# Patient Record
Sex: FEMALE | Race: BLACK OR AFRICAN AMERICAN | NOT HISPANIC OR LATINO | Employment: FULL TIME | RURAL
[De-identification: names, ages, dates, MRNs, and addresses within clinical notes are randomized per-mention and may not be internally consistent; named-entity substitution may affect disease eponyms.]

---

## 2018-05-09 ENCOUNTER — OFFICE VISIT (OUTPATIENT)
Dept: FAMILY MEDICINE CLINIC | Facility: CLINIC | Age: 33
End: 2018-05-09

## 2018-05-09 VITALS
TEMPERATURE: 99.7 F | HEART RATE: 76 BPM | HEIGHT: 70 IN | DIASTOLIC BLOOD PRESSURE: 66 MMHG | OXYGEN SATURATION: 97 % | SYSTOLIC BLOOD PRESSURE: 107 MMHG | RESPIRATION RATE: 20 BRPM | BODY MASS INDEX: 22.48 KG/M2 | WEIGHT: 157 LBS

## 2018-05-09 DIAGNOSIS — M54.41 ACUTE BILATERAL LOW BACK PAIN WITH BILATERAL SCIATICA: Primary | ICD-10-CM

## 2018-05-09 DIAGNOSIS — M54.42 ACUTE BILATERAL LOW BACK PAIN WITH BILATERAL SCIATICA: Primary | ICD-10-CM

## 2018-05-09 LAB
BILIRUB BLD-MCNC: NEGATIVE MG/DL
CLARITY, POC: CLEAR
COLOR UR: YELLOW
GLUCOSE UR STRIP-MCNC: NEGATIVE MG/DL
KETONES UR QL: NEGATIVE
LEUKOCYTE EST, POC: NEGATIVE
NITRITE UR-MCNC: NEGATIVE MG/ML
PH UR: 6 [PH] (ref 5–8)
PROT UR STRIP-MCNC: NEGATIVE MG/DL
RBC # UR STRIP: ABNORMAL /UL
SP GR UR: 1 (ref 1–1.03)
UROBILINOGEN UR QL: NORMAL

## 2018-05-09 PROCEDURE — 99213 OFFICE O/P EST LOW 20 MIN: CPT | Performed by: NURSE PRACTITIONER

## 2018-05-09 PROCEDURE — 96372 THER/PROPH/DIAG INJ SC/IM: CPT | Performed by: NURSE PRACTITIONER

## 2018-05-09 PROCEDURE — 81002 URINALYSIS NONAUTO W/O SCOPE: CPT | Performed by: NURSE PRACTITIONER

## 2018-05-09 RX ORDER — TRIAMCINOLONE ACETONIDE 40 MG/ML
40 INJECTION, SUSPENSION INTRA-ARTICULAR; INTRAMUSCULAR ONCE
Status: COMPLETED | OUTPATIENT
Start: 2018-05-09 | End: 2018-05-09

## 2018-05-09 RX ORDER — METHOCARBAMOL 500 MG/1
500 TABLET, FILM COATED ORAL NIGHTLY PRN
Qty: 15 TABLET | Refills: 0 | Status: SHIPPED | OUTPATIENT
Start: 2018-05-09

## 2018-05-09 RX ADMIN — TRIAMCINOLONE ACETONIDE 40 MG: 40 INJECTION, SUSPENSION INTRA-ARTICULAR; INTRAMUSCULAR at 12:45

## 2018-05-09 NOTE — PROGRESS NOTES
Subjective   Victoria Rogel is a 33 y.o. female.     Back Pain   This is a new problem. Episode onset: x 2-3 days. The problem occurs constantly. The problem is unchanged. The pain is present in the lumbar spine. The quality of the pain is described as aching and shooting. Radiates to: bilateral thighs. The pain is at a severity of 9/10. The symptoms are aggravated by twisting, standing and position. Associated symptoms include leg pain. Pertinent negatives include no abdominal pain, bladder incontinence, bowel incontinence, chest pain, dysuria, fever, headaches, numbness, paresis, paresthesias, pelvic pain, perianal numbness, tingling, weakness or weight loss. 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 She has tried nothing (afraid to take OTC meds due to history of stomach problems) for the symptoms.        The following portions of the patient's history were reviewed and updated as appropriate: allergies, current medications, past medical history, past social history, past surgical history and problem list.    Review of Systems   Constitutional: Negative for appetite change, chills, fatigue, fever and unexpected weight loss.   HENT: Negative for sore throat.    Eyes: Negative for discharge and itching.   Respiratory: Negative for cough, chest tightness, shortness of breath and wheezing.    Cardiovascular: Negative for chest pain.   Gastrointestinal: Negative for abdominal pain, blood in stool, bowel incontinence, diarrhea, nausea and vomiting.   Genitourinary: Negative for urinary incontinence, difficulty urinating, dysuria, flank pain, frequency, pelvic pain and vaginal discharge.        Due to start cycle today     Musculoskeletal: Positive for back pain. Negative for neck pain and neck stiffness.   Skin: Negative for rash.   Neurological: Negative for dizziness, tingling, weakness, numbness, headache and paresthesias.   Hematological: Negative for adenopathy.       Objective    BP  "107/66 (BP Location: Left arm, Patient Position: Sitting, Cuff Size: Adult)   Pulse 76   Temp 99.7 °F (37.6 °C) (Tympanic)   Resp 20   Ht 177.8 cm (70\")   Wt 71.2 kg (157 lb)   LMP 04/12/2018 (Approximate)   SpO2 97%   BMI 22.53 kg/m²     Physical Exam   Constitutional: She is oriented to person, place, and time. She appears well-developed and well-nourished. No distress.   Cardiovascular: Normal rate and regular rhythm.    Pulmonary/Chest: Effort normal and breath sounds normal. She has no wheezes. She has no rales.   Abdominal: Soft. Bowel sounds are normal. There is no tenderness. There is no rebound and no guarding.   Musculoskeletal:        Lumbar back: She exhibits decreased range of motion ( due to pain), tenderness and spasm. She exhibits no swelling, no edema and no laceration.   Normal strength of lower extremities     Neurological: She is alert and oriented to person, place, and time.   Nursing note and vitals reviewed.    Recent Results (from the past 24 hour(s))   POCT urinalysis dipstick, manual    Collection Time: 05/09/18 12:29 PM   Result Value Ref Range    Color Yellow Yellow, Straw, Dark Yellow, Victoria    Clarity, UA Clear Clear    Glucose, UA Negative Negative, 1000 mg/dL (3+) mg/dL    Bilirubin Negative Negative    Ketones, UA Negative Negative    Specific Gravity  1.005 1.005 - 1.030    Blood, UA 2+ (A) Negative    pH, Urine 6.0 5.0 - 8.0    Protein, POC Negative Negative mg/dL    Urobilinogen, UA Normal Normal    Leukocytes Negative Negative    Nitrite, UA Negative Negative   due to start cycle today      Assessment/Plan   Victoria was seen today for back pain.    Diagnoses and all orders for this visit:    Acute bilateral low back pain with bilateral sciatica  -     POCT urinalysis dipstick, manual  -     ketorolac (TORADOL) injection 30 mg; Inject 1 mL into the shoulder, thigh, or buttocks 1 (One) Time.  -     triamcinolone acetonide (KENALOG-40) injection 40 mg; Inject 1 mL into the " shoulder, thigh, or buttocks 1 (One) Time.  -     methocarbamol (ROBAXIN) 500 MG tablet; Take 1 tablet by mouth At Night As Needed for Muscle Spasms (back pain).      Push fluids  Rest  Tylenol as needed  Toradol and Kenalog injections in office  Rx for Robaxin at bedtime  Moist heat or ice to back as needed  Light stretching exercises recommended    Declines back xrays today, but recommended if persistent pain or frequently recurring pain    See PCP or RTC if symptoms persist/worsen    Declines RTW note

## 2022-10-17 NOTE — PATIENT INSTRUCTIONS
Back Pain, Adult  Back pain is very common in adults. The cause of back pain is rarely dangerous and the pain often gets better over time. The cause of your back pain may not be known. Some common causes of back pain include:  · Strain of the muscles or ligaments supporting the spine.  · Wear and tear (degeneration) of the spinal disks.  · Arthritis.  · Direct injury to the back.  For many people, back pain may return. Since back pain is rarely dangerous, most people can learn to manage this condition on their own.  Follow these instructions at home:  Watch your back pain for any changes. The following actions may help to lessen any discomfort you are feeling:  · Remain active. It is stressful on your back to sit or  one place for long periods of time. Do not sit, drive, or  one place for more than 30 minutes at a time. Take short walks on even surfaces as soon as you are able. Try to increase the length of time you walk each day.  · Exercise regularly as directed by your health care provider. Exercise helps your back heal faster. It also helps avoid future injury by keeping your muscles strong and flexible.  · Do not stay in bed. Resting more than 1-2 days can delay your recovery.  · Pay attention to your body when you bend and lift. The most comfortable positions are those that put less stress on your recovering back. Always use proper lifting techniques, including:  ¨ Bending your knees.  ¨ Keeping the load close to your body.  ¨ Avoiding twisting.  · Find a comfortable position to sleep. Use a firm mattress and lie on your side with your knees slightly bent. If you lie on your back, put a pillow under your knees.  · Avoid feeling anxious or stressed. Stress increases muscle tension and can worsen back pain. It is important to recognize when you are anxious or stressed and learn ways to manage it, such as with exercise.  · Take medicines only as directed by your health care provider.  Over-the-counter medicines to reduce pain and inflammation are often the most helpful. Your health care provider may prescribe muscle relaxant drugs. These medicines help dull your pain so you can more quickly return to your normal activities and healthy exercise.  · Apply ice to the injured area:  ¨ Put ice in a plastic bag.  ¨ Place a towel between your skin and the bag.  ¨ Leave the ice on for 20 minutes, 2-3 times a day for the first 2-3 days. After that, ice and heat may be alternated to reduce pain and spasms.  · Maintain a healthy weight. Excess weight puts extra stress on your back and makes it difficult to maintain good posture.  Contact a health care provider if:  · You have pain that is not relieved with rest or medicine.  · You have increasing pain going down into the legs or buttocks.  · You have pain that does not improve in one week.  · You have night pain.  · You lose weight.  · You have a fever or chills.  Get help right away if:  · You develop new bowel or bladder control problems.  · You have unusual weakness or numbness in your arms or legs.  · You develop nausea or vomiting.  · You develop abdominal pain.  · You feel faint.  This information is not intended to replace advice given to you by your health care provider. Make sure you discuss any questions you have with your health care provider.  Document Released: 12/18/2006 Document Revised: 04/27/2017 Document Reviewed: 04/21/2015  [employer] Interactive Patient Education © 2017 [employer]     Anesthesia Volume In Cc: 6